# Patient Record
Sex: FEMALE | URBAN - METROPOLITAN AREA
[De-identification: names, ages, dates, MRNs, and addresses within clinical notes are randomized per-mention and may not be internally consistent; named-entity substitution may affect disease eponyms.]

---

## 2020-11-11 ENCOUNTER — OFFICE VISIT (OUTPATIENT)
Dept: URBAN - METROPOLITAN AREA CLINIC 27 | Facility: CLINIC | Age: 80
End: 2020-11-11
Payer: MEDICARE

## 2020-11-11 DIAGNOSIS — H04.123 DRY EYE SYNDROME OF BILATERAL LACRIMAL GLANDS: ICD-10-CM

## 2020-11-11 DIAGNOSIS — H25.13 AGE-RELATED NUCLEAR CATARACT, BILATERAL: ICD-10-CM

## 2020-11-11 DIAGNOSIS — H35.373 PUCKERING OF MACULA, BILATERAL: ICD-10-CM

## 2020-11-11 DIAGNOSIS — E11.3393 TYPE 2 DIAB WITH MOD NONP RTNOP WITHOUT MACULAR EDEMA, BI: ICD-10-CM

## 2020-11-11 DIAGNOSIS — H35.81 RETINAL EDEMA: Primary | ICD-10-CM

## 2020-11-11 PROCEDURE — 92014 COMPRE OPH EXAM EST PT 1/>: CPT | Performed by: OPHTHALMOLOGY

## 2020-11-11 PROCEDURE — 92134 CPTRZ OPH DX IMG PST SGM RTA: CPT | Performed by: OPHTHALMOLOGY

## 2020-11-11 PROCEDURE — 67028 INJECTION EYE DRUG: CPT | Performed by: OPHTHALMOLOGY

## 2020-11-11 PROCEDURE — 92235 FLUORESCEIN ANGRPH MLTIFRAME: CPT | Performed by: OPHTHALMOLOGY

## 2020-11-11 ASSESSMENT — INTRAOCULAR PRESSURE
OD: 14
OS: 16

## 2020-11-11 NOTE — IMPRESSION/PLAN
Impression: CME, OS. Status: Symptomatic.
s/p Triesence x 1 last 08/14/19 Plan: Exam and OCT reveal CME OS (455 microns, from 522 microns). An IVFA from 11/11/2020 demonstrated no telangiectasia. Fundus Photos from 11/11/2020 demonstrated no IRH. Recommend Triesence. RBA discussed. The patient elects Triesence. Thanks, Epimenio Flick Return in 1 month for re eval CME, possible Triesence vs Avastin.

## 2020-11-11 NOTE — IMPRESSION/PLAN
Impression: NPDR, OU. Status: Symptomatic. Plan: Blood sugar control. DM since 2017. Moderate. An IVFA from 02/07/2020 demonstrated no NVE. Fundus Photos from 02/07/2020 demonstrated no NVD. Observe.

## 2020-11-11 NOTE — IMPRESSION/PLAN
Impression: h/o ERM, OU. Status: Symptomatic.
s/p Avastin x 1 ( Dr. Jewels Chicas in [facility]) s/p PPV/MP ILM/ICG/PRP OD - 01/28/19(DYK)
s/p PPV/MP ILM/ICG/PRP OS - 02/18/19 (DYK) Plan: Exam and OCT reveal an improved foveal contour. The patient has done well with surgery. Oberve.

## 2020-11-11 NOTE — IMPRESSION/PLAN
Impression: CME, OD. Status: Symptomatic.
s/p Triesence x 1 last 08/14/19 (consented 08/14/19) Plan: Exam and OCT reveal CME OD (389 microns, from 417 microns). An IVFA from 11/11/2020 demonstrated no telangiectasia. Fundus Photos from 11/11/2020 demonstrated no IRH. Observe.

## 2022-11-02 ENCOUNTER — OFFICE VISIT (OUTPATIENT)
Dept: URBAN - METROPOLITAN AREA CLINIC 27 | Facility: CLINIC | Age: 82
End: 2022-11-02
Payer: MEDICARE

## 2022-11-02 DIAGNOSIS — H35.81 RETINAL EDEMA: Primary | ICD-10-CM

## 2022-11-02 DIAGNOSIS — H04.123 DRY EYE SYNDROME OF BILATERAL LACRIMAL GLANDS: ICD-10-CM

## 2022-11-02 DIAGNOSIS — E11.3393 TYPE 2 DIAB WITH MOD NONP RTNOP WITHOUT MACULAR EDEMA, BI: ICD-10-CM

## 2022-11-02 DIAGNOSIS — H25.13 AGE-RELATED NUCLEAR CATARACT, BILATERAL: ICD-10-CM

## 2022-11-02 DIAGNOSIS — H35.373 PUCKERING OF MACULA, BILATERAL: ICD-10-CM

## 2022-11-02 PROCEDURE — 99214 OFFICE O/P EST MOD 30 MIN: CPT | Performed by: OPHTHALMOLOGY

## 2022-11-02 PROCEDURE — 92134 CPTRZ OPH DX IMG PST SGM RTA: CPT | Performed by: OPHTHALMOLOGY

## 2022-11-02 ASSESSMENT — INTRAOCULAR PRESSURE
OD: 28
OS: 19

## 2022-11-02 NOTE — IMPRESSION/PLAN
Impression: CME, OS. Status: Symptomatic.
s/p Triesence x  last 11/11/2020  Plan: Exam and OCT reveal CME OS (318 microns, from 455 microns, from 522 microns). An IVFA from 11/11/2020 demonstrated no telangiectasia. Fundus Photos from 11/11/2020 demonstrated no IRH. Observe. Thanks, Garrett He Return in 3 months for re eval CME, IVFA OS 1st

## 2022-11-02 NOTE — IMPRESSION/PLAN
Impression: h/o ERM, OU. Status: Symptomatic.
s/p Avastin x 1 ( Dr. Leopold Boyden in [facility]) s/p PPV/MP ILM/ICG/PRP OD - 01/28/19(DYK)
s/p PPV/MP ILM/ICG/PRP OS - 02/18/19 (DYK) Plan: Exam and OCT reveal an improved foveal contour. The patient has done well with surgery. Oberve.

## 2022-11-02 NOTE — IMPRESSION/PLAN
Impression: NPDR, OU. Status: Symptomatic. Plan: Blood sugar control. DM since 2017. Moderate. An IVFA from 11/11/2020 demonstrated no NVE. Fundus Photos from 11/11/2020 demonstrated no NVD. Observe.

## 2023-02-22 ENCOUNTER — OFFICE VISIT (OUTPATIENT)
Dept: URBAN - METROPOLITAN AREA CLINIC 27 | Facility: CLINIC | Age: 83
End: 2023-02-22
Payer: MEDICARE

## 2023-02-22 DIAGNOSIS — E11.3393 TYPE 2 DIAB WITH MOD NONP RTNOP WITHOUT MACULAR EDEMA, BI: ICD-10-CM

## 2023-02-22 DIAGNOSIS — H35.373 PUCKERING OF MACULA, BILATERAL: ICD-10-CM

## 2023-02-22 DIAGNOSIS — H35.81 RETINAL EDEMA: Primary | ICD-10-CM

## 2023-02-22 DIAGNOSIS — H04.123 DRY EYE SYNDROME OF BILATERAL LACRIMAL GLANDS: ICD-10-CM

## 2023-02-22 DIAGNOSIS — H25.13 AGE-RELATED NUCLEAR CATARACT, BILATERAL: ICD-10-CM

## 2023-02-22 PROCEDURE — 92235 FLUORESCEIN ANGRPH MLTIFRAME: CPT | Performed by: OPHTHALMOLOGY

## 2023-02-22 PROCEDURE — 99214 OFFICE O/P EST MOD 30 MIN: CPT | Performed by: OPHTHALMOLOGY

## 2023-02-22 PROCEDURE — 92134 CPTRZ OPH DX IMG PST SGM RTA: CPT | Performed by: OPHTHALMOLOGY

## 2023-02-22 ASSESSMENT — INTRAOCULAR PRESSURE
OD: 9
OS: 9

## 2023-02-22 NOTE — IMPRESSION/PLAN
Impression: SARAI, OU. Status: Symptomatic. Glaucoma OU - coordinate care
 s/p glaucoma laser OS Plan: AFTS.

## 2023-02-22 NOTE — IMPRESSION/PLAN
Impression: h/o ERM, OU. Status: Symptomatic.
s/p Avastin x 1 ( Dr. Dimitry Olivo in [facility]") s/p PPV/MP ILM/ICG/PRP OD - 01/28/19(DYK)
s/p PPV/MP ILM/ICG/PRP OS - 02/18/19 (DYK) Plan: Exam and OCT reveal an improved foveal contour. The patient has done well with surgery. Oberve.

## 2023-02-22 NOTE — IMPRESSION/PLAN
Impression: CME, OS. Status: Symptomatic.
s/p Triesence x  last 11/11/2020 Plan: Exam and OCT reveal CME OS (415 microns, from 318 microns, from 455 microns, from 522 microns). An IVFA from 02/22/2023 demonstrated no telangiectasia. Fundus Photos from 02/22/2023 demonstrated no IRH. Observe. Thanks, Tianna Haynes Return in 18 months for re eval CME, IVFA OS 1st

## 2023-02-22 NOTE — IMPRESSION/PLAN
Impression: NPDR, OU. Status: Symptomatic. Plan: Blood sugar control. DM since 2017. Moderate. An IVFA from 02/22/2023 demonstrated no NVE. Fundus Photos from 02/22/2023 demonstrated no NVD. Observe.

## 2023-11-28 ENCOUNTER — OFFICE VISIT (OUTPATIENT)
Dept: URBAN - METROPOLITAN AREA CLINIC 27 | Facility: CLINIC | Age: 83
End: 2023-11-28
Payer: MEDICARE

## 2023-11-28 DIAGNOSIS — E11.3393 TYPE 2 DIABETES MELLITUS WITH MODERATE NONPROLIFERATIVE DIABETIC RETINOPATHY WITHOUT MACULAR EDEMA, BILATERAL: ICD-10-CM

## 2023-11-28 DIAGNOSIS — H35.353 CYSTOID MACULAR DEGENERATION, BILATERAL: Primary | ICD-10-CM

## 2023-11-28 DIAGNOSIS — H35.373 PUCKERING OF MACULA, BILATERAL: ICD-10-CM

## 2023-11-28 DIAGNOSIS — Z96.1 PRESENCE OF INTRAOCULAR LENS: ICD-10-CM

## 2023-11-28 DIAGNOSIS — H04.123 DRY EYE SYNDROME OF BILATERAL LACRIMAL GLANDS: ICD-10-CM

## 2023-11-28 PROCEDURE — 92014 COMPRE OPH EXAM EST PT 1/>: CPT | Performed by: OPHTHALMOLOGY

## 2023-11-28 PROCEDURE — 92134 CPTRZ OPH DX IMG PST SGM RTA: CPT | Performed by: OPHTHALMOLOGY

## 2023-11-28 PROCEDURE — 92235 FLUORESCEIN ANGRPH MLTIFRAME: CPT | Performed by: OPHTHALMOLOGY

## 2023-11-28 ASSESSMENT — INTRAOCULAR PRESSURE
OD: 12
OS: 14

## 2024-05-24 ENCOUNTER — OFFICE VISIT (OUTPATIENT)
Dept: URBAN - METROPOLITAN AREA CLINIC 28 | Facility: CLINIC | Age: 84
End: 2024-05-24
Payer: MEDICARE

## 2024-05-24 DIAGNOSIS — H40.1132 PRIMARY OPEN-ANGLE GLAUCOMA, BILATERAL, MODERATE STAGE: Primary | ICD-10-CM

## 2024-05-24 PROCEDURE — 99204 OFFICE O/P NEW MOD 45 MIN: CPT | Performed by: OPHTHALMOLOGY

## 2024-05-24 PROCEDURE — 76514 ECHO EXAM OF EYE THICKNESS: CPT | Performed by: OPHTHALMOLOGY

## 2024-05-24 PROCEDURE — 92134 CPTRZ OPH DX IMG PST SGM RTA: CPT | Performed by: OPHTHALMOLOGY

## 2024-05-24 PROCEDURE — 92133 CPTRZD OPH DX IMG PST SGM ON: CPT | Performed by: OPHTHALMOLOGY

## 2024-05-24 PROCEDURE — 92020 GONIOSCOPY: CPT | Performed by: OPHTHALMOLOGY

## 2024-05-24 RX ORDER — DORZOLAMIDE HYDROCHLORIDE AND TIMOLOL MALEATE 20; 5 MG/ML; MG/ML
SOLUTION/ DROPS OPHTHALMIC
Qty: 15 | Refills: 4 | Status: ACTIVE
Start: 2024-05-24

## 2024-05-24 ASSESSMENT — INTRAOCULAR PRESSURE
OD: 16
OS: 19

## 2024-06-11 ENCOUNTER — OFFICE VISIT (OUTPATIENT)
Dept: URBAN - METROPOLITAN AREA CLINIC 27 | Facility: CLINIC | Age: 84
End: 2024-06-11
Payer: MEDICARE

## 2024-06-11 DIAGNOSIS — H04.123 DRY EYE SYNDROME OF BILATERAL LACRIMAL GLANDS: ICD-10-CM

## 2024-06-11 DIAGNOSIS — Z96.1 PRESENCE OF INTRAOCULAR LENS: ICD-10-CM

## 2024-06-11 DIAGNOSIS — E11.3393 TYPE 2 DIABETES MELLITUS WITH MODERATE NONPROLIFERATIVE DIABETIC RETINOPATHY WITHOUT MACULAR EDEMA, BILATERAL: ICD-10-CM

## 2024-06-11 DIAGNOSIS — H35.353 CYSTOID MACULAR DEGENERATION, BILATERAL: Primary | ICD-10-CM

## 2024-06-11 DIAGNOSIS — H35.373 PUCKERING OF MACULA, BILATERAL: ICD-10-CM

## 2024-06-11 PROCEDURE — 92134 CPTRZ OPH DX IMG PST SGM RTA: CPT | Performed by: OPHTHALMOLOGY

## 2024-06-11 PROCEDURE — 92014 COMPRE OPH EXAM EST PT 1/>: CPT | Performed by: OPHTHALMOLOGY

## 2024-06-11 ASSESSMENT — INTRAOCULAR PRESSURE
OS: 17
OD: 15

## 2024-11-26 ENCOUNTER — OFFICE VISIT (OUTPATIENT)
Dept: URBAN - METROPOLITAN AREA CLINIC 28 | Facility: CLINIC | Age: 84
End: 2024-11-26
Payer: MEDICARE

## 2024-11-26 DIAGNOSIS — H40.1123 PRIMARY OPEN-ANGLE GLAUCOMA, LEFT EYE, SEVERE STAGE: Primary | ICD-10-CM

## 2024-11-26 DIAGNOSIS — H40.1112 PRIMARY OPEN-ANGLE GLAUCOMA, RIGHT EYE, MODERATE STAGE: ICD-10-CM

## 2024-11-26 PROCEDURE — 99214 OFFICE O/P EST MOD 30 MIN: CPT | Performed by: OPHTHALMOLOGY

## 2024-11-26 PROCEDURE — 92083 EXTENDED VISUAL FIELD XM: CPT | Performed by: OPHTHALMOLOGY

## 2024-11-26 RX ORDER — DORZOLAMIDE HYDROCHLORIDE AND TIMOLOL MALEATE 20; 5 MG/ML; MG/ML
SOLUTION/ DROPS OPHTHALMIC
Qty: 15 | Refills: 4 | Status: ACTIVE
Start: 2024-11-26

## 2024-11-26 ASSESSMENT — KERATOMETRY
OD: 42.25
OS: 42.13

## 2024-12-12 ENCOUNTER — OFFICE VISIT (OUTPATIENT)
Dept: URBAN - METROPOLITAN AREA CLINIC 27 | Facility: CLINIC | Age: 84
End: 2024-12-12
Payer: MEDICARE

## 2024-12-12 DIAGNOSIS — H04.123 DRY EYE SYNDROME OF BILATERAL LACRIMAL GLANDS: ICD-10-CM

## 2024-12-12 DIAGNOSIS — H35.373 PUCKERING OF MACULA, BILATERAL: ICD-10-CM

## 2024-12-12 DIAGNOSIS — Z96.1 PRESENCE OF INTRAOCULAR LENS: ICD-10-CM

## 2024-12-12 DIAGNOSIS — H35.353 CYSTOID MACULAR DEGENERATION, BILATERAL: Primary | ICD-10-CM

## 2024-12-12 DIAGNOSIS — E11.3393 TYPE 2 DIAB WITH MOD NONP RTNOP WITHOUT MACULAR EDEMA, BI: ICD-10-CM

## 2024-12-12 PROCEDURE — 92014 COMPRE OPH EXAM EST PT 1/>: CPT | Performed by: OPHTHALMOLOGY

## 2024-12-12 PROCEDURE — 92134 CPTRZ OPH DX IMG PST SGM RTA: CPT | Performed by: OPHTHALMOLOGY

## 2024-12-12 ASSESSMENT — INTRAOCULAR PRESSURE
OD: 15
OS: 18

## 2025-03-25 ENCOUNTER — OFFICE VISIT (OUTPATIENT)
Dept: URBAN - METROPOLITAN AREA CLINIC 28 | Facility: CLINIC | Age: 85
End: 2025-03-25
Payer: MEDICARE

## 2025-03-25 DIAGNOSIS — Z79.84 LONG TERM CURRENT USE OF ORAL HYPOGLYCEMIC DRUGS: ICD-10-CM

## 2025-03-25 DIAGNOSIS — H35.373 PUCKERING OF MACULA, BILATERAL: ICD-10-CM

## 2025-03-25 DIAGNOSIS — H40.1133 PRIMARY OPEN-ANGLE GLAUCOMA, BILATERAL, SEVERE STAGE: ICD-10-CM

## 2025-03-25 DIAGNOSIS — H52.223 REGULAR ASTIGMATISM, BILATERAL: ICD-10-CM

## 2025-03-25 DIAGNOSIS — E11.9 TYPE 2 DIABETES MELLITUS W/O COMPLICATION: Primary | ICD-10-CM

## 2025-03-25 PROCEDURE — 92004 COMPRE OPH EXAM NEW PT 1/>: CPT | Performed by: OPTOMETRIST

## 2025-03-25 PROCEDURE — 92134 CPTRZ OPH DX IMG PST SGM RTA: CPT | Performed by: OPTOMETRIST

## 2025-03-25 ASSESSMENT — VISUAL ACUITY
OD: 20/25
OS: 20/150

## 2025-03-25 ASSESSMENT — INTRAOCULAR PRESSURE
OD: 16
OS: 16

## 2025-05-20 ENCOUNTER — OFFICE VISIT (OUTPATIENT)
Dept: URBAN - METROPOLITAN AREA CLINIC 28 | Facility: CLINIC | Age: 85
End: 2025-05-20
Payer: MEDICARE

## 2025-05-20 DIAGNOSIS — H40.1133 PRIMARY OPEN-ANGLE GLAUCOMA, BILATERAL, SEVERE STAGE: Primary | ICD-10-CM

## 2025-05-20 PROCEDURE — 99213 OFFICE O/P EST LOW 20 MIN: CPT | Performed by: OPHTHALMOLOGY

## 2025-05-20 PROCEDURE — 92133 CPTRZD OPH DX IMG PST SGM ON: CPT | Performed by: OPHTHALMOLOGY

## 2025-05-20 ASSESSMENT — INTRAOCULAR PRESSURE
OS: 19
OD: 16

## 2025-06-12 ENCOUNTER — OFFICE VISIT (OUTPATIENT)
Dept: URBAN - METROPOLITAN AREA CLINIC 27 | Facility: CLINIC | Age: 85
End: 2025-06-12
Payer: MEDICARE

## 2025-06-12 DIAGNOSIS — H35.353 CYSTOID MACULAR DEGENERATION, BILATERAL: Primary | ICD-10-CM

## 2025-06-12 DIAGNOSIS — Z96.1 PRESENCE OF INTRAOCULAR LENS: ICD-10-CM

## 2025-06-12 DIAGNOSIS — H40.9 GLAUCOMA: ICD-10-CM

## 2025-06-12 DIAGNOSIS — H35.373 PUCKERING OF MACULA, BILATERAL: ICD-10-CM

## 2025-06-12 DIAGNOSIS — E11.3393 TYPE 2 DIAB WITH MOD NONP RTNOP WITHOUT MACULAR EDEMA, BI: ICD-10-CM

## 2025-06-12 PROCEDURE — 92014 COMPRE OPH EXAM EST PT 1/>: CPT | Performed by: OPHTHALMOLOGY

## 2025-06-12 PROCEDURE — 92134 CPTRZ OPH DX IMG PST SGM RTA: CPT | Performed by: OPHTHALMOLOGY

## 2025-06-12 ASSESSMENT — INTRAOCULAR PRESSURE
OD: 14
OS: 16